# Patient Record
Sex: FEMALE | Race: WHITE | NOT HISPANIC OR LATINO | Employment: PART TIME | URBAN - METROPOLITAN AREA
[De-identification: names, ages, dates, MRNs, and addresses within clinical notes are randomized per-mention and may not be internally consistent; named-entity substitution may affect disease eponyms.]

---

## 2017-01-16 DIAGNOSIS — R11.0 NAUSEA: Primary | ICD-10-CM

## 2017-01-16 DIAGNOSIS — Z29.89 ALTITUDE SICKNESS PREVENTATIVE MEASURES: ICD-10-CM

## 2017-01-16 RX ORDER — ONDANSETRON 4 MG/1
4-8 TABLET, ORALLY DISINTEGRATING ORAL EVERY 8 HOURS PRN
Qty: 20 TABLET | Refills: 1 | Status: SHIPPED | OUTPATIENT
Start: 2017-01-16 | End: 2019-02-22

## 2017-04-05 DIAGNOSIS — J06.9 VIRAL UPPER RESPIRATORY TRACT INFECTION: Primary | ICD-10-CM

## 2017-04-05 RX ORDER — AZITHROMYCIN 250 MG/1
TABLET, FILM COATED ORAL
Qty: 6 TABLET | Refills: 1 | Status: SHIPPED | OUTPATIENT
Start: 2017-04-05 | End: 2017-04-17

## 2017-04-17 ENCOUNTER — OFFICE VISIT (OUTPATIENT)
Dept: DERMATOLOGY | Facility: CLINIC | Age: 32
End: 2017-04-17
Attending: DERMATOLOGY
Payer: COMMERCIAL

## 2017-04-17 VITALS
WEIGHT: 250 LBS | SYSTOLIC BLOOD PRESSURE: 113 MMHG | DIASTOLIC BLOOD PRESSURE: 75 MMHG | HEIGHT: 67 IN | BODY MASS INDEX: 39.24 KG/M2 | HEART RATE: 101 BPM

## 2017-04-17 DIAGNOSIS — D22.9 BENIGN NEVUS: ICD-10-CM

## 2017-04-17 DIAGNOSIS — L85.8 KP (KERATOSIS PILARIS): ICD-10-CM

## 2017-04-17 DIAGNOSIS — D48.5 NEOPLASM OF UNCERTAIN BEHAVIOR OF SKIN: Primary | ICD-10-CM

## 2017-04-17 PROCEDURE — 88305 TISSUE EXAM BY PATHOLOGIST: CPT | Performed by: DERMATOLOGY

## 2017-04-17 PROCEDURE — 11421 EXC H-F-NK-SP B9+MARG 0.6-1: CPT | Mod: ZF | Performed by: DERMATOLOGY

## 2017-04-17 PROCEDURE — 99213 OFFICE O/P EST LOW 20 MIN: CPT | Mod: ZF

## 2017-04-17 PROCEDURE — 12041 INTMD RPR N-HF/GENIT 2.5CM/<: CPT | Mod: ZF | Performed by: DERMATOLOGY

## 2017-04-17 RX ORDER — LIDOCAINE HYDROCHLORIDE AND EPINEPHRINE 10; 10 MG/ML; UG/ML
3 INJECTION, SOLUTION INFILTRATION; PERINEURAL ONCE
Qty: 3 ML | Refills: 0 | OUTPATIENT
Start: 2017-04-17 | End: 2017-04-17

## 2017-04-17 RX ORDER — CETIRIZINE HYDROCHLORIDE 10 MG/1
10 TABLET ORAL
COMMUNITY
Start: 2012-02-06

## 2017-04-17 ASSESSMENT — PAIN SCALES - GENERAL: PAINLEVEL: NO PAIN (0)

## 2017-04-17 NOTE — PROGRESS NOTES
Dermatology Clinic Note    Dermatology Problem List:  1. Keratosis pilaris  2. Benign nevi  3. Neoplasm of uncertain behavior on the L lateral neck, excisional bx on 4/17/17. Suspected traumatized nevus vs dysplastic nevus  4. Hx of tanning bed use weekly x 4-5 years while in high school      CC:   Chief Complaint   Patient presents with     Establish Care     Skin chek , mole on neck         HPI: Flori Richards is a 31 year old female presenting for initial evaluation of a mole on the L neck.  She is new and self-referred.  She notes a mole on the L neck that seems like it might be changing. It is difficult for her to monitor given the location. It does not bleed and is not painful. No other similar spots. No hx of skin cancer or dysplastic nevus. She is now cautious in the sun, but does have hx of tanning bed use and a severe sunburn as a teen.       Patient Active Problem List   Diagnosis     CARDIOVASCULAR SCREENING; LDL GOAL LESS THAN 160       Allergies   Allergen Reactions     Sulfa Drugs Hives         Current Outpatient Prescriptions   Medication     cetirizine (ZYRTEC) 10 MG tablet     Esomeprazole Magnesium (NEXIUM PO)     drospirenone-ethinyl estradiol (SHIVANI) 3-0.02 MG per tablet     ondansetron (ZOFRAN ODT) 4 MG ODT tab     [DISCONTINUED] drospirenone-ethinyl estradiol (SHIVANI) 3-0.02 MG per tablet     No current facility-administered medications for this visit.        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     EXAM:  /75  Pulse 101  Ht 1.702 m (5' 7\")  Wt 113.4 kg (250 lb)  Breastfeeding? No  BMI 39.16 kg/m2  GEN: Alert, no distress  HEENT: Conjunctiva clear.   PULM: Breathing comfortably on RA  CV: Extrem warm and well perfused  ABD: No distension  SKIN: Exam of the face, neck, chest, abdomen, back, arms, legs, hands, feet, buttocks. Normal except as follows:    --6 mm x5 mm dark brown slightly raises papule on the L lateral neck with pigment globules centrally  --Pink hyperkeratotic follicularly based papules on the lateral arms, thighs, buttocks bilaterally.   --Scattered but few 1-3 mm medium brown macules on the extensor arms, legs, back  --Firm 4 mm subcutaneous papule on the R upper arms with + dimple sign.     Procedure Note:  Patient was consented to an excisional biopsy. Area was cleansed with alcohol pad and area was infiltrated with 2ml of lidocaine with epinephrine. An elliptical excision with 15 blade to the level of the subcutis was performed and lesion was closed with a 4.0 vicryl deep suture and 3 prolene interrupted sutures. Wound dressed with petrolatum and bandage. Patient to change dressing daily. Suture removal in 10 days.   Lesion size 6 mm x 5 mm  Margins 1 mm  Closure length 8 mm          Assessment and Plan:    1. Neoplasm of uncertain behavior of skin: Changing nevus on the L lateral neck. Concern for dysplastic nevus vs traumatized compound nevus. Patient with hx of tanning bed use. Excised today. Will contact with results with available.   - Surgical pathology exam    2. Keratosis pilaris: Benign skin variant. May use emollients with lactic or salicylic acid such as Amlactin, Cerave SA.     3. Benign pigmented nevi: No lesions of concern. Sun protection recommended. Discussed ABCDEs of malignant melanoma.     4. Dermatofibroma: Benign collagenous collection on the R " upper arm. No treatment advised unless growing.       RTC in 1 year, sooner as needed.       Katrina Fair MD  Dermatology Staff

## 2017-04-17 NOTE — MR AVS SNAPSHOT
After Visit Summary   4/17/2017    Flori Richards    MRN: [id number]           Patient Information     Date Of Birth          1985        Visit Information        Provider Department      4/17/2017 8:15 AM Katrina Fair MD [facility]        Care Instructions    WOUND CARE: Wound Care After a Biopsy    How should I care for my wound for the first 24 hours?    If it bleeds, hold direct pressure on the area for 10 minutes    Acetaminophen (TylenolTM) as needed for pain    How should I care for the wound after 24 hours?    Remove the bandage     You may bathe or shower as normal    How do I clean my wound?    Use white petroleum/Vaseline  to keep sutures moist twice daily.     Replace the Bandaid /bandage to keep the wound covered until it is completely healed (not needed in the scalp)    What should I use to clean my wound?     White petroleum jelly (Vaseline )     Bandaids   as needed    How should I care for my wound after a shave biopsy?    Keep up with wound care for one week or until the area is healed     How should I care for my wound after a punch biopsy?    Complete wound care until the stitches are removed     Stitches need to be removed in 14 days. You may return to our clinic for this or you may have it done locally at your doctor s office.    When should I call my doctor?    If you have increased:   o Pain or swelling    o Pus or drainage  o Temperature over 101? F (38.3 ? C)   Redness or warmth  around wound          Follow-ups after your visit        Who to contact     Please call your clinic at [phone] to:    Ask questions about your health    Make or cancel appointments    Discuss your medicines    Learn about your test results    Speak to your doctor   If you have compliments or concerns about an experience at your clinic, or if you wish to file a complaint, please contact [facility] Patient Relations at [phone] or  "email us at [email or web address]         Additional Information About Your Visit        [employer] [facility] Information     [facility] gives you secure access to your electronic health record. If you see a primary care provider, you can also send messages to your care team and make appointments. If you have questions, please call your primary care clinic.  If you do not have a primary care provider, please call [phone] and they will assist you.      [facility] is an electronic gateway that provides easy, online access to your medical records. With [employer], you can request a clinic appointment, read your test results, renew a prescription or communicate with your care team.     To access your existing account, please contact your [facility] or call [phone] for assistance.        Care EveryWhere ID     This is your Care EveryWhere ID. This could be used by other organizations to access your [facility] medical records  [id number]        Your Vitals Were     Pulse Height Breastfeeding? BMI (Body Mass Index)          101 1.702 m (5' 7\") No 39.16 kg/m2         Blood Pressure from Last 3 Encounters:   04/17/17 113/75   11/07/16 118/80   07/20/15 116/76    Weight from Last 3 Encounters:   04/17/17 113.4 kg (250 lb)   11/07/16 117.9 kg (260 lb)   07/20/15 116.1 kg (256 lb)              Today, you had the following     No orders found for display         Today's Medication Changes          These changes are accurate as of: 4/17/17  8:58 AM.  If you have any questions, ask your nurse or doctor.               These medicines have changed or have updated prescriptions.        Dose/Directions    drospirenone-ethinyl estradiol 3-0.02 MG per tablet   Commonly known as:  SHIVANI   This may have changed:  Another medication with the same name was removed. Continue taking this medication, and follow the directions you see here.   Used for:  Routine general medical examination at a health care facility, " Other general counseling and advice for contraceptive management   Changed by:  Megan Parr APRN CNP        Dose:  1 tablet   Take 1 tablet by mouth daily   Quantity:  3 Package   Refills:  3         Stop taking these medicines if you haven't already. Please contact your care team if you have questions.     acetaminophen-codeine 300-30 MG per tablet   Commonly known as:  TYLENOL w/CODEINE No. 3   Stopped by:  Katrina Fair MD           azithromycin 250 MG tablet   Commonly known as:  ZITHROMAX   Stopped by:  Katrina Fair MD           cyclobenzaprine 10 MG tablet   Commonly known as:  FLEXERIL   Stopped by:  Katrina Fair MD           lidocaine visc 2% & diphenhydramine 12.5mg/5mL & maalox/mylanta w/simethicone (1:1:1 v/v/v) Susp compounding kit   Stopped by:  Katrina Fair MD                    Primary Care Provider    None Specified       No primary provider on file.        Thank you!     Thank you for choosing [facility]  for your care. Our goal is always to provide you with excellent care. Hearing back from our patients is one way we can continue to improve our services. Please take a few minutes to complete the written survey that you may receive in the mail after your visit with us. Thank you!             Your Updated Medication List - Protect others around you: Learn how to safely use, store and throw away your medicines at www.disposemymeds.org.          This list is accurate as of: 4/17/17  8:58 AM.  Always use your most recent med list.                   Brand Name Dispense Instructions for use    cetirizine 10 MG tablet    zyrTEC     Take 10 mg by mouth       drospirenone-ethinyl estradiol 3-0.02 MG per tablet    SHIVANI    3 Package    Take 1 tablet by mouth daily       NEXIUM PO          ondansetron 4 MG ODT tab    ZOFRAN ODT    20 tablet    Take 1-2 tablets (4-8 mg) by mouth every 8 hours as needed for nausea

## 2017-04-17 NOTE — NURSING NOTE
Chief Complaint   Patient presents with     Establish Care     Skin chek , mole on neck   Una Martinez LPN

## 2017-04-17 NOTE — LETTER
Date:April 18, 2017      Patient was self referred, no letter generated. Do not send.        [facility] Health Information

## 2017-04-17 NOTE — LETTER
4/17/2017       RE: Flori Richards  [address]   [place] [address]     Dear Colleague,    Thank you for referring your patient, Flori Richards, to the [facility] at [facility]. Please see a copy of my visit note below.    Dermatology Clinic Note    Dermatology Problem List:  1. Keratosis pilaris  2. Benign nevi  3. Neoplasm of uncertain behavior on the L lateral neck, excisional bx on 4/17/17. Suspected traumatized nevus vs dysplastic nevus  4. Hx of tanning bed use weekly x 4-5 years while in high school      CC:   Chief Complaint   Patient presents with     [facility]     Skin chek , mole on neck         HPI: Flori Richards is a 31 year old female presenting for initial evaluation of a mole on the L neck.  She is new and self-referred.  She notes a mole on the L neck that seems like it might be changing. It is difficult for her to monitor given the location. It does not bleed and is not painful. No other similar spots. No hx of skin cancer or dysplastic nevus. She is now cautious in the sun, but does have hx of tanning bed use and a severe sunburn as a teen.       Patient Active Problem List   Diagnosis     CARDIOVASCULAR SCREENING; LDL GOAL LESS THAN 160       Allergies   Allergen Reactions     Sulfa Drugs Hives         Current Outpatient Prescriptions   Medication     cetirizine (ZYRTEC) 10 MG tablet     Esomeprazole Magnesium (NEXIUM PO)     drospirenone-ethinyl estradiol (SHIVANI) 3-0.02 MG per tablet     ondansetron (ZOFRAN ODT) 4 MG ODT tab     [DISCONTINUED] drospirenone-ethinyl estradiol (SHIVANI) 3-0.02 MG per tablet     No current facility-administered medications for this visit.        Family History   Problem Relation Age of Onset     Breast Cancer Maternal Aunt        Social History     Social History     Marital status: Single     Spouse name: N/A     Number of children: N/A     Years of education: N/A     Occupational History      "RN [employer]     [employer]     Social History Main Topics     Smoking status: Never Smoker     Smokeless tobacco: Never Used     Alcohol use Yes      Comment: rarely     Drug use: No     Sexual activity: Not Currently     Birth control/ protection: Pill     Other Topics Concern     Not on file     Social History Narrative         ROS: Feeling well without other skin concerns.     EXAM:  /75  Pulse 101  Ht 1.702 m (5' 7\")  Wt 113.4 kg (250 lb)  Breastfeeding? No  BMI 39.16 kg/m2  GEN: Alert, no distress  HEENT: Conjunctiva clear.   PULM: Breathing comfortably on RA  CV: Extrem warm and well perfused  ABD: No distension  SKIN: Exam of the face, neck, chest, abdomen, back, arms, legs, hands, feet, buttocks. Normal except as follows:    --6 mm x5 mm dark brown slightly raises papule on the L lateral neck with pigment globules centrally  --Pink hyperkeratotic follicularly based papules on the lateral arms, thighs, buttocks bilaterally.   --Scattered but few 1-3 mm medium brown macules on the extensor arms, legs, back  --Firm 4 mm subcutaneous papule on the R upper arms with + dimple sign.     Procedure Note:  Patient was consented to an excisional biopsy. Area was cleansed with alcohol pad and area was infiltrated with 2ml of lidocaine with epinephrine. An elliptical excision with 15 blade to the level of the subcutis was performed and lesion was closed with a 4.0 vicryl deep suture and 3 prolene interrupted sutures. Wound dressed with petrolatum and bandage. Patient to change dressing daily. Suture removal in 10 days.   Lesion size 6 mm x 5 mm  Margins 1 mm  Closure length 8 mm          Assessment and Plan:    1. Neoplasm of uncertain behavior of skin: Changing nevus on the L lateral neck. Concern for dysplastic nevus vs traumatized compound nevus. Patient with hx of tanning bed use. Excised today. Will contact with results with available.   - Surgical pathology exam    2. " Keratosis pilaris: Benign skin variant. May use emollients with lactic or salicylic acid such as Amlactin, Cerave SA.     3. Benign pigmented nevi: No lesions of concern. Sun protection recommended. Discussed ABCDEs of malignant melanoma.     4. Dermatofibroma: Benign collagenous collection on the R upper arm. No treatment advised unless growing.       RTC in 1 year, sooner as needed.       Katrina Fair MD  Dermatology Staff                  Again, thank you for allowing me to participate in the care of your patient.      Sincerely,    Katrina Fair MD

## 2017-04-17 NOTE — PATIENT INSTRUCTIONS
WOUND CARE: Wound Care After a Biopsy    How should I care for my wound for the first 24 hours?    If it bleeds, hold direct pressure on the area for 10 minutes    Acetaminophen (TylenolTM) as needed for pain    How should I care for the wound after 24 hours?    Remove the bandage     You may bathe or shower as normal    How do I clean my wound?    Use white petroleum/Vaseline  to keep sutures moist twice daily.     Replace the Bandaid /bandage to keep the wound covered until it is completely healed (not needed in the scalp)    What should I use to clean my wound?     White petroleum jelly (Vaseline )     Bandaids   as needed    How should I care for my wound after a shave biopsy?    Keep up with wound care for one week or until the area is healed     How should I care for my wound after a punch biopsy?    Complete wound care until the stitches are removed     Stitches need to be removed in 14 days. You may return to our clinic for this or you may have it done locally at your doctor s office.    When should I call my doctor?    If you have increased:   o Pain or swelling    o Pus or drainage  o Temperature over 101? F (38.3 ? C)   Redness or warmth  around wound

## 2017-04-19 LAB — COPATH REPORT: NORMAL

## 2017-06-21 ENCOUNTER — TELEPHONE (OUTPATIENT)
Dept: SURGERY | Facility: CLINIC | Age: 32
End: 2017-06-21

## 2017-06-21 NOTE — TELEPHONE ENCOUNTER
"Patient interested in gastric balloon if covered by insurance.  Ht 5'7\", wt 255 lbs, BMI 39.9. Does not have any obesity related co-morbidities.    Plan:  To set up appointment with medical weight management.  To start a food, feelings and activity journal in the meantime.  "

## 2017-10-30 ENCOUNTER — TRANSFERRED RECORDS (OUTPATIENT)
Dept: HEALTH INFORMATION MANAGEMENT | Facility: CLINIC | Age: 32
End: 2017-10-30

## 2017-12-17 ENCOUNTER — HEALTH MAINTENANCE LETTER (OUTPATIENT)
Age: 32
End: 2017-12-17

## 2018-02-08 DIAGNOSIS — J01.10 ACUTE NON-RECURRENT FRONTAL SINUSITIS: Primary | ICD-10-CM

## 2018-02-08 RX ORDER — AZITHROMYCIN 250 MG/1
TABLET, FILM COATED ORAL
Qty: 6 TABLET | Refills: 0 | Status: SHIPPED | OUTPATIENT
Start: 2018-02-08 | End: 2019-04-01

## 2018-03-07 ENCOUNTER — TELEPHONE (OUTPATIENT)
Dept: OTHER | Facility: CLINIC | Age: 33
End: 2018-03-07

## 2018-03-07 NOTE — TELEPHONE ENCOUNTER
3/7/2018    Call Regarding [employer]    Attempt 1    Message on voicemail     Comments: no dep      Outreach   Neeru Johnson

## 2019-02-22 DIAGNOSIS — Z29.89 ALTITUDE SICKNESS PREVENTATIVE MEASURES: ICD-10-CM

## 2019-02-22 RX ORDER — ONDANSETRON 4 MG/1
4-8 TABLET, ORALLY DISINTEGRATING ORAL EVERY 8 HOURS PRN
Qty: 20 TABLET | Refills: 1 | Status: SHIPPED | OUTPATIENT
Start: 2019-02-22 | End: 2019-04-01

## 2019-04-01 ENCOUNTER — OFFICE VISIT (OUTPATIENT)
Dept: INTERNAL MEDICINE | Facility: CLINIC | Age: 34
End: 2019-04-01
Payer: COMMERCIAL

## 2019-04-01 VITALS
HEART RATE: 98 BPM | HEIGHT: 66 IN | BODY MASS INDEX: 42.23 KG/M2 | DIASTOLIC BLOOD PRESSURE: 85 MMHG | OXYGEN SATURATION: 98 % | SYSTOLIC BLOOD PRESSURE: 123 MMHG | WEIGHT: 262.8 LBS

## 2019-04-01 DIAGNOSIS — R42 DIZZY SPELLS: Primary | ICD-10-CM

## 2019-04-01 DIAGNOSIS — R42 DIZZY SPELLS: ICD-10-CM

## 2019-04-01 LAB
ANION GAP SERPL CALCULATED.3IONS-SCNC: 8 MMOL/L (ref 3–14)
BUN SERPL-MCNC: 7 MG/DL (ref 7–30)
CALCIUM SERPL-MCNC: 8.7 MG/DL (ref 8.5–10.1)
CHLORIDE SERPL-SCNC: 107 MMOL/L (ref 94–109)
CO2 SERPL-SCNC: 21 MMOL/L (ref 20–32)
CREAT SERPL-MCNC: 0.59 MG/DL (ref 0.52–1.04)
GFR SERPL CREATININE-BSD FRML MDRD: >90 ML/MIN/{1.73_M2}
GLUCOSE SERPL-MCNC: 80 MG/DL (ref 70–99)
HBA1C MFR BLD: 5.4 % (ref 0–5.6)
HGB BLD-MCNC: 11.4 G/DL (ref 11.7–15.7)
POTASSIUM SERPL-SCNC: 3.5 MMOL/L (ref 3.4–5.3)
SODIUM SERPL-SCNC: 137 MMOL/L (ref 133–144)

## 2019-04-01 RX ORDER — CHOLECALCIFEROL (VITAMIN D3) 50 MCG
2 TABLET ORAL DAILY
COMMUNITY
End: 2023-04-17

## 2019-04-01 ASSESSMENT — ENCOUNTER SYMPTOMS
LOSS OF CONSCIOUSNESS: 0
WEAKNESS: 0
HEADACHES: 0
TINGLING: 0
SEIZURES: 0
MEMORY LOSS: 0
PARALYSIS: 0
TREMORS: 0
DISTURBANCES IN COORDINATION: 0
SPEECH CHANGE: 0
NUMBNESS: 0
DIZZINESS: 1

## 2019-04-01 ASSESSMENT — MIFFLIN-ST. JEOR: SCORE: 1924.18

## 2019-04-01 ASSESSMENT — PAIN SCALES - GENERAL: PAINLEVEL: NO PAIN (0)

## 2019-04-01 NOTE — PROGRESS NOTES
HPI:       Flori Richards is a 33 year old female who presents for the following  Patient presents with: Establish Care (Pt is here to establish care with a new PCP) and Dizziness (Pt c/o of dizziness for over a month now.)    Flori is here for evaluation of extreme dizziness over the past several weeks. It happens in short bouts-about 3 seconds. This happens about 6 times/day. She feels as though she is almost going to blackout and her head feels foggy. She does not feel as though the room spins. She does get symptoms when she turns her head, but not when she turns over in bed. She also gets symptoms with laughing or coughing. She did also have an episode where she fell down her steps. She does not recall actually falling, but just ending up at the bottom of her steps.  She has tried increasing her water intake to 80 oz/day, which has not made much of a difference. Also, it does not improve when increasing nutrition or sleep. She has also tried to decrease screen time which has not made a difference. She has not had symptoms with physical activity.     Problem, Medication and Allergy Lists were reviewed and are current.  Patient is a new patient to this clinic and so  I reviewed/updated the Past Medical History, the Family History and the Social History.          Review of Systems:   Review of Systems     Constitutional:  Negative for fever, chills, weight loss, weight gain, fatigue, decreased appetite, night sweats, recent stressors, height gain, height loss, post-operative complications, incisional pain, hallucinations, increased energy, hyperactivity and confused.   HENT:  Negative for ear pain, hearing loss, tinnitus, nosebleeds, trouble swallowing, hoarse voice, mouth sores, sore throat, ear discharge, tooth pain, gum tenderness, taste disturbance, smell disturbance, hearing aid, bleeding gums, dry mouth, sinus pain, sinus congestion and neck mass.    Eyes:  Negative for double vision, pain, redness,  eye pain, decreased vision, eye watering, eye bulging, eye dryness, flashing lights, spots, floaters, strabismus, tunnel vision, jaundice and eye irritation.   Respiratory:   Negative for cough, hemoptysis, sputum production, shortness of breath, wheezing, sleep disturbances due to breathing, snores loudly, respiratory pain, dyspnea on exertion, cough disturbing sleep and postural dyspnea.    Cardiovascular:  Negative for chest pain, dyspnea on exertion, palpitations, orthopnea, claudication, leg swelling, fingers/toes turn blue, hypertension, hypotension, syncope, history of heart murmur, chest pain on exertion, chest pain at rest, pacemaker, few scattered varicosities, leg pain, sleep disturbances due to breathing, tachycardia, light-headedness, exercise intolerance and edema.   Gastrointestinal:  Negative for heartburn, nausea, vomiting, abdominal pain, diarrhea, constipation, blood in stool, melena, rectal pain, bloating, hemorrhoids, bowel incontinence, jaundice, rectal bleeding, coffee ground emesis and change in stool.   Genitourinary:  Negative for bladder incontinence, dysuria, urgency, hematuria, flank pain, vaginal discharge, difficulty urinating, genital sores, dyspareunia, decreased libido, nocturia, voiding less frequently, arousal difficulty, abnormal vaginal bleeding, excessive menstruation, menstrual changes, hot flashes, vaginal dryness and postmenopausal bleeding.   Musculoskeletal:  Negative for myalgias, back pain, joint swelling, arthralgias, stiffness, muscle cramps, neck pain, bone pain, muscle weakness and fracture.   Skin:  Negative for nail changes, itching, poor wound healing, rash, hair changes, skin changes, acne, warts, poor wound healing, scarring, flaky skin, Raynaud's phenomenon, sensitivity to sunlight and skin thickening.   Neurological:  Positive for dizziness. Negative for tingling, tremors, speech change, seizures, loss of consciousness, weakness, light-headedness, numbness,  "headaches, disturbances in coordination, memory loss, difficulty walking and paralysis.   Endo/Heme:  Negative for anemia, swollen glands and bruises/bleeds easily.   Psychiatric/Behavioral:  Negative for depression, hallucinations, memory loss, decreased concentration, mood swings and panic attacks.    Breast:  Negative for breast discharge, breast mass, breast pain and nipple retraction.   Endocrine:  Negative for altered temperature regulation, polyphagia, polydipsia, unwanted hair growth and change in facial hair.    I have personally reviewed and updated the ROS on the day of the visit.           Physical Exam:   /85   Pulse 98   Ht 1.685 m (5' 6.34\")   Wt 119.2 kg (262 lb 12.8 oz)   LMP 02/13/2019 (Approximate)   SpO2 98%   Breastfeeding? No   BMI 41.98 kg/m    Body mass index is 41.98 kg/m .  Vitals were reviewed       GENERAL APPEARANCE: healthy, alert and no distress     EYES: EOMI, PERRL     HENT: ear canals and TM's normal and nose and mouth without ulcers or lesions     NECK: no adenopathy, no asymmetry, masses, or scars and thyroid normal to palpation     RESP: lungs clear to auscultation - no rales, rhonchi or wheezes     CV: regular rates and rhythm, normal S1 S2, no S3 or S4 and no murmur, click or rub     ABDOMEN:  soft, nontender, no HSM or masses and bowel sounds normal     MS: extremities normal- no gross deformities noted, no evidence of inflammation in joints, FROM in all extremities.     SKIN: no suspicious lesions or rashes     NEURO: Normal strength and tone, sensory exam grossly normal, mentation intact and speech normal     NEURO: cranial nerves 2-12 intact, proprioception normal and finger-nose normal     PSYCH: mentation appears normal. and affect normal/bright     LYMPHATICS: No cervical adenopathy        Results:   EKG: Normal sinus rhythm, no ST changes    Assessment and Plan     Flori was seen today for establish care and dizziness.    Diagnoses and all orders for this " visit:    Dizzy spells. Unclear etiology of spells. Differential BPPV vs cardiac vs neurogenic. Since symptoms happen with changes in head position, will have her see PT to r/o BPPV. However, her symptoms are not classic for this (not room spinning). If this is not revealing, will proceed with getting ZioPatch to look for any arrhythmias. It is somewhat concerning that she had a fall that she does not recall actually falling. Less likely would be some type of seizure disorder, however, the short duration and no post-ictal period speaks against this.  -     EKG 12-lead complete w/read - Clinics  -     PHYSICAL THERAPY REFERRAL; Future  -     Hemoglobin; Future  -     Hemoglobin A1c; Future  -     Basic metabolic panel; Future    Options for treatment and follow-up care were reviewed with the patient. Flori Richards engaged in the decision making process and verbalized understanding of the options discussed and agreed with the final plan.    Deandra Dela Cruz MD  Apr 1, 2019

## 2019-04-01 NOTE — NURSING NOTE
Chief Complaint   Patient presents with     Establish Care     Pt is here to establish care with a new PCP     Dizziness     Pt c/o of dizziness for over a month now.       Aleta Zafar, Clinic EMT at 4:12 PM on 4/1/2019

## 2019-04-01 NOTE — PATIENT INSTRUCTIONS
[facility] Phone Number: [phone]   [facility] Center Medication Refill Request Information:  * Please contact your pharmacy regarding ANY request for medication refills.  ** [facility] Prescription Fax = [phone]  * Please allow 3 business days for routine medication refills.  * Please allow 5 business days for controlled substance medication refills.     Primary [facility] Test Result notification information:  *You will be notified with in 7-10 days of your appointment day regarding the results of your test.  If you are on MyChart you will be notified as soon as the provider has reviewed the results and signed off on them.

## 2019-04-02 LAB — INTERPRETATION ECG - MUSE: NORMAL

## 2019-04-02 ASSESSMENT — ENCOUNTER SYMPTOMS
SPEECH CHANGE: 0
MUSCLE WEAKNESS: 0
NECK PAIN: 0
ABDOMINAL PAIN: 0
DEPRESSION: 0
TACHYCARDIA: 0
EYE IRRITATION: 0
ORTHOPNEA: 0
INCREASED ENERGY: 0
BACK PAIN: 0
TROUBLE SWALLOWING: 0
SMELL DISTURBANCE: 0
HEMOPTYSIS: 0
LEG SWELLING: 0
FEVER: 0
DIZZINESS: 1
SINUS CONGESTION: 0
SWOLLEN GLANDS: 0
NECK MASS: 0
POOR WOUND HEALING: 0
SNORES LOUDLY: 0
HOT FLASHES: 0
SPUTUM PRODUCTION: 0
RECTAL PAIN: 0
HALLUCINATIONS: 0
HOARSE VOICE: 0
ARTHRALGIAS: 0
STIFFNESS: 0
JOINT SWELLING: 0
CONSTIPATION: 0
SORE THROAT: 0
BOWEL INCONTINENCE: 0
CLAUDICATION: 0
TINGLING: 0
POLYPHAGIA: 0
INSOMNIA: 0
HEARTBURN: 0
EYE REDNESS: 0
MYALGIAS: 0
LEG PAIN: 0
DECREASED LIBIDO: 0
BREAST MASS: 0
DISTURBANCES IN COORDINATION: 0
HYPOTENSION: 0
SINUS PAIN: 0
COUGH: 0
DECREASED APPETITE: 0
SLEEP DISTURBANCES DUE TO BREATHING: 0
RECTAL BLEEDING: 0
WEIGHT LOSS: 0
NERVOUS/ANXIOUS: 0
MUSCLE CRAMPS: 0
DIFFICULTY URINATING: 0
POLYDIPSIA: 0
WHEEZING: 0
BLOATING: 0
NIGHT SWEATS: 0
LOSS OF CONSCIOUSNESS: 0
JAUNDICE: 0
SKIN CHANGES: 0
RESPIRATORY PAIN: 0
NAUSEA: 0
DYSPNEA ON EXERTION: 0
DECREASED CONCENTRATION: 0
CHILLS: 0
TREMORS: 0
PARALYSIS: 0
SYNCOPE: 0
MEMORY LOSS: 0
DIARRHEA: 0
POSTURAL DYSPNEA: 0
FLANK PAIN: 0
BREAST PAIN: 0
DOUBLE VISION: 0
ALTERED TEMPERATURE REGULATION: 0
VOMITING: 0
TASTE DISTURBANCE: 0
HEADACHES: 0
FATIGUE: 0
PANIC: 0
PALPITATIONS: 0
HEMATURIA: 0
EYE WATERING: 0
BRUISES/BLEEDS EASILY: 0
HYPERTENSION: 0
SHORTNESS OF BREATH: 0
WEIGHT GAIN: 0
WEAKNESS: 0
NUMBNESS: 0
EYE PAIN: 0
DYSURIA: 0
BLOOD IN STOOL: 0
NAIL CHANGES: 0
LIGHT-HEADEDNESS: 0
EXERCISE INTOLERANCE: 0
COUGH DISTURBING SLEEP: 0
SEIZURES: 0

## 2019-04-23 ENCOUNTER — HOSPITAL ENCOUNTER (OUTPATIENT)
Dept: PHYSICAL THERAPY | Facility: CLINIC | Age: 34
Setting detail: THERAPIES SERIES
End: 2019-04-23
Attending: INTERNAL MEDICINE
Payer: COMMERCIAL

## 2019-04-23 DIAGNOSIS — R42 DIZZY SPELLS: ICD-10-CM

## 2019-04-23 PROCEDURE — 95992 CANALITH REPOSITIONING PROC: CPT | Mod: GP | Performed by: PHYSICAL THERAPIST

## 2019-04-23 PROCEDURE — 97161 PT EVAL LOW COMPLEX 20 MIN: CPT | Mod: GP | Performed by: PHYSICAL THERAPIST

## 2019-04-23 NOTE — PROGRESS NOTES
04/23/19 1500   Quick Adds   Quick Adds Vestibular Eval   General Information   Start of Care Date 04/23/19   Referring Physician Deandra Dela Cruz   Orders Evaluate and Treat as Indicated   Order Date 04/01/19   Medical Diagnosis dizziness   Onset of illness/injury or Date of Surgery   (jan 2019)   Surgical/Medical history reviewed Yes   Pertinent history of current problem (include personal factors and/or comorbidities that impact the POC) Flori is here alone.  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  2 months onset  Jan.  quick mov't or bending dizzy spells brief.  no hits to head.  fell down stairs jan.     Pertinent Visual History  contacts or glasses.     Prior level of functional mobility Ambulation   Ambulation no issues w/ gait.  PT noted foot slap bilat    Prior level of function comment works as a nurse.   Current Community Support Family/friend caregiver   Patient role/Employment history Employed  (RN)   Living environment House[facility]   Home/Community Accessibility Comments stairs but fell down in Jan. 20 w/ rail.  got up fine.  carpeted/ was wearing socks.  did EKG was fine.     Patient/Family Goals Statement not be dizzy   General Information Comments pt here alone. lives [facility]   Fall Risk Screen   Fall screen completed by PT   Have you fallen 2 or more times in the past year? No   Have you fallen and had an injury in the past year? No   Fall screen comments one fall in jan, down a flight of steps   System Outcome Measures   Outcome Measures BPPV   Dizziness Handicap Inventory (score out of 100) A decrease in score by 17.18 or greater indicates a clinically significant change in symptoms. 26   Pain   Patient currently in pain No   Pain comments neck is stiff   Vitals Signs   Heart Rate 87   Blood Pressure 121/75   Cognitive Status Examination   Orientation orientation to person, place and time   Level of Consciousness alert   Follows Commands and Answers  Questions 100% of the time   Personal Safety and Judgment intact   Memory intact   Integumentary   Integumentary No deficits were identified   Posture   Posture Forward head position   Range of Motion (ROM)   ROM Comment wfls   Strength   Strength Comments wfls   Bed Mobility   Bed Mobility Comments dizzy in R hallpike   Transfer Skills   Transfer Comments indep   Locomotion   Wheel Chair Mobility Comments n/a   Gait Special Tests   Gait Special Tests 25 FOOT TIMED WALK   Gait Special Tests 25 Foot Timed Walk   Seconds 8    Steps 14 Steps   Comments bilat foot slap noted   Balance   Balance Comments not tested   Sensory Examination   Sensory Perception no deficits were identified   Coordination   Coordination no deficits were identified   Muscle Tone   Muscle Tone no deficits were identified   Cervicogenic Screen   Neck ROM [facility]   Oculomotor Exam   Smooth Pursuit Normal   Saccades Normal   VOR Normal   Rapid Head Thrust Normal   Convergence Testing Abnormal   Convergence Testing Comments R eye sl slow   Infrared Goggle Exam or Frenzel Lense Exam   Vestibular Suppressant in Last 24 Hours? No   Exam completed with Infrared Goggles   Spontaneous Nystagmus Negative   Gaze Evoked Nystagmus Negative   Qi-Hallpike (right) Upbeating R torsional   Qi-Hallpike (right) comments 1 to 2 beats.  mild sxs   Qi-Hallpike (Left) Negative   HSCC Supine Roll Test (Right) Upbeating R torsional   HSCC Supine Roll Test (Right) Comments 10 sec mild sxs   HSCC Supine Roll Test (Left) Negative   BPPV Canal(s) R Posterior   BPPV Type Canalithasis   Planned Therapy Interventions   Planned Therapy Interventions other (see comments);neuromuscular re-education   Planned Therapy Interventions Comment do head shake, treat bppv   Clinical Impression   Criteria for Skilled Therapeutic Interventions Met yes, treatment indicated   PT Diagnosis ?R PC bppv   Influenced by the following impairments dizzy sxs.  nystagmus   Functional limitations due to  impairments dizzy w/ bed mob   Clinical Presentation Stable/Uncomplicated   Clinical Decision Making (Complexity) Low complexity   Therapy Frequency other (see comments)   Predicted Duration of Therapy Intervention (days/wks)  up to 4x in 60 days   Risk & Benefits of therapy have been explained Yes   Patient, Family & other staff in agreement with plan of care Yes   Clinical Impression Comments probable R bppv, noted decreased R eye movt w/ convergence test and bilat foot slap w/ gait w/ slip on shoes.   Education Assessment   Preferred Learning Style Listening   Barriers to Learning No barriers   GOALS   PT Eval Goals 1;2   Goal 1   Goal Identifier dhi   Goal Description dhi score to be at 13 or less to show improved sxs   Target Date 06/12/19   Goal 2   Goal Identifier work   Goal Description pt to report no sxs w/ bending up/down at work   Target Date 06/21/19   Total Evaluation Time   PT Srini Low Complexity Minutes (89663) 20

## 2019-08-08 ENCOUNTER — TELEPHONE (OUTPATIENT)
Dept: OBGYN | Facility: CLINIC | Age: 34
End: 2019-08-08

## 2019-08-08 DIAGNOSIS — T75.3XXA MOTION SICKNESS: Primary | ICD-10-CM

## 2019-08-08 RX ORDER — SCOLOPAMINE TRANSDERMAL SYSTEM 1 MG/1
1 PATCH, EXTENDED RELEASE TRANSDERMAL
Qty: 10 PATCH | Refills: 0 | Status: SHIPPED | OUTPATIENT
Start: 2019-08-08 | End: 2023-04-17

## 2019-09-13 DIAGNOSIS — H00.036 EYELID CELLULITIS, LEFT: Primary | ICD-10-CM

## 2019-12-06 ENCOUNTER — TELEPHONE (OUTPATIENT)
Dept: OBGYN | Facility: CLINIC | Age: 34
End: 2019-12-06

## 2019-12-06 DIAGNOSIS — R11.0 NAUSEA: Primary | ICD-10-CM

## 2019-12-06 RX ORDER — ONDANSETRON 4 MG/1
4 TABLET, ORALLY DISINTEGRATING ORAL EVERY 8 HOURS PRN
Qty: 18 TABLET | Refills: 0 | Status: SHIPPED | OUTPATIENT
Start: 2019-12-06 | End: 2020-04-14

## 2020-03-02 ENCOUNTER — HEALTH MAINTENANCE LETTER (OUTPATIENT)
Age: 35
End: 2020-03-02

## 2020-04-14 DIAGNOSIS — R11.0 NAUSEA: ICD-10-CM

## 2020-04-14 RX ORDER — ONDANSETRON 4 MG/1
4 TABLET, ORALLY DISINTEGRATING ORAL EVERY 8 HOURS PRN
Qty: 18 TABLET | Refills: 0 | Status: SHIPPED | OUTPATIENT
Start: 2020-04-14 | End: 2022-01-12

## 2020-12-20 ENCOUNTER — HEALTH MAINTENANCE LETTER (OUTPATIENT)
Age: 35
End: 2020-12-20

## 2021-04-24 ENCOUNTER — HEALTH MAINTENANCE LETTER (OUTPATIENT)
Age: 36
End: 2021-04-24

## 2022-01-12 DIAGNOSIS — R11.0 NAUSEA: ICD-10-CM

## 2022-01-12 RX ORDER — ONDANSETRON 4 MG/1
4 TABLET, ORALLY DISINTEGRATING ORAL EVERY 8 HOURS PRN
Qty: 18 TABLET | Refills: 0 | Status: SHIPPED | OUTPATIENT
Start: 2022-01-12 | End: 2022-10-28

## 2022-05-15 ENCOUNTER — HEALTH MAINTENANCE LETTER (OUTPATIENT)
Age: 37
End: 2022-05-15

## 2022-08-19 DIAGNOSIS — N30.00 ACUTE CYSTITIS WITHOUT HEMATURIA: Primary | ICD-10-CM

## 2022-08-19 RX ORDER — NITROFURANTOIN 25; 75 MG/1; MG/1
100 CAPSULE ORAL 2 TIMES DAILY
Qty: 20 CAPSULE | Refills: 0 | Status: SHIPPED | OUTPATIENT
Start: 2022-08-19 | End: 2023-04-17

## 2022-08-19 RX ORDER — PHENAZOPYRIDINE HYDROCHLORIDE 100 MG/1
100 TABLET, FILM COATED ORAL 3 TIMES DAILY PRN
Qty: 9 TABLET | Refills: 0 | Status: SHIPPED | OUTPATIENT
Start: 2022-08-19 | End: 2023-04-17

## 2022-09-10 ENCOUNTER — HEALTH MAINTENANCE LETTER (OUTPATIENT)
Age: 37
End: 2022-09-10

## 2022-10-28 DIAGNOSIS — R11.0 NAUSEA: ICD-10-CM

## 2022-10-28 RX ORDER — ONDANSETRON 4 MG/1
4 TABLET, ORALLY DISINTEGRATING ORAL EVERY 8 HOURS PRN
Qty: 18 TABLET | Refills: 0 | Status: SHIPPED | OUTPATIENT
Start: 2022-10-28 | End: 2023-01-20

## 2023-01-20 DIAGNOSIS — R11.0 NAUSEA: ICD-10-CM

## 2023-01-20 RX ORDER — ONDANSETRON 4 MG/1
4 TABLET, ORALLY DISINTEGRATING ORAL EVERY 8 HOURS PRN
Qty: 18 TABLET | Refills: 3 | Status: SHIPPED | OUTPATIENT
Start: 2023-01-20

## 2023-03-14 ENCOUNTER — TELEPHONE (OUTPATIENT)
Dept: OBGYN | Facility: CLINIC | Age: 38
End: 2023-03-14

## 2023-03-14 DIAGNOSIS — N93.9 ABNORMAL VAGINAL BLEEDING: Primary | ICD-10-CM

## 2023-04-17 ENCOUNTER — OFFICE VISIT (OUTPATIENT)
Dept: DERMATOLOGY | Facility: CLINIC | Age: 38
End: 2023-04-17
Attending: DERMATOLOGY
Payer: COMMERCIAL

## 2023-04-17 VITALS
WEIGHT: 229 LBS | BODY MASS INDEX: 36.58 KG/M2 | HEART RATE: 98 BPM | SYSTOLIC BLOOD PRESSURE: 108 MMHG | DIASTOLIC BLOOD PRESSURE: 74 MMHG

## 2023-04-17 DIAGNOSIS — L98.8 RHYTIDES: Primary | ICD-10-CM

## 2023-04-17 DIAGNOSIS — D23.9 DERMATOFIBROMA: ICD-10-CM

## 2023-04-17 DIAGNOSIS — D22.9 MULTIPLE NEVI: ICD-10-CM

## 2023-04-17 PROCEDURE — 99203 OFFICE O/P NEW LOW 30 MIN: CPT | Performed by: DERMATOLOGY

## 2023-04-17 PROCEDURE — G0463 HOSPITAL OUTPT CLINIC VISIT: HCPCS | Performed by: DERMATOLOGY

## 2023-04-17 ASSESSMENT — PAIN SCALES - GENERAL: PAINLEVEL: NO PAIN (0)

## 2023-04-17 NOTE — PROGRESS NOTES
Dermatology Clinic Note    Dermatology Problem List:  1. Keratosis pilaris  2. Benign nevi  3. Dermatofibromas  4. Hx of tanning bed use weekly x 4-5 years while in high school      CC:   Chief Complaint   Patient presents with     RECHECK         HPI: Flori Richards is a 36 y/o presenting for skin check. No history of skin cancer. No lesions of concern. She is interested in cosmetic botox. She notes a new lump on the ankle.       Patient Active Problem List   Diagnosis     CARDIOVASCULAR SCREENING; LDL GOAL LESS THAN 160     Neoplasm of uncertain behavior of skin     Benign nevus     KP (keratosis pilaris)       Allergies   Allergen Reactions     Sulfa Drugs Hives     Sulfamethoxazole-Trimethoprim Rash     Rash but possible hives when young         Current Outpatient Medications   Medication     drospirenone-ethinyl estradiol (SHIVANI) 3-0.02 MG per tablet     Esomeprazole Magnesium (NEXIUM PO)     cetirizine (ZYRTEC) 10 MG tablet     ondansetron (ZOFRAN ODT) 4 MG ODT tab     No current facility-administered medications for this visit.       Family History   Problem Relation Age of Onset     Breast Cancer Maternal Aunt      Depression Mother      Hypertension Mother      Obesity Mother      Hyperlipidemia Mother      Uterine Cancer Mother      Hypertension Father      Obesity Father      Lung Cancer Paternal Grandmother        ROS: Feeling well without other skin concerns.     EXAM:  /74   Pulse 98   Wt 103.9 kg (229 lb)   BMI 36.58 kg/m    GEN: Alert, no distress  HEENT: Conjunctiva clear.   PULM: Breathing comfortably on RA  CV: Extrem warm and well perfused  ABD: No distension  SKIN: Exam of the face, neck, chest, abdomen, back, arms, legs, hands, feet, buttocks. Normal except as follows:  --Pink hyperkeratotic follicularly based papules on the lateral arms, thighs, buttocks bilaterally.   --Scattered but few 1-3 mm medium brown macules on the extensor arms, legs, back  --Firm 4 mm subcutaneous papule on  the R upper arm with + dimple sign.   --Firm 4 mm papule on the dorsal shin with +dimple sign      Assessment and Plan:    1. Benign pigmented nevi: No lesions of concern. Sun protection recommended. Discussed ABCDEs of malignant melanoma.     2. Dermatofibromas: Collection of excess collagen and fibrosis at past sites of skin injury. Benign. No treatment advised as this may result in a larger lesion. Lesion on the arm and leg. No treatment.     3. Rhytides: Referral for cosmetic botox.       RTC in as needed, will fup with cosmetics for botox.     Katrina Fair MD  Dermatology Staff

## 2023-04-17 NOTE — PATIENT INSTRUCTIONS
Thank you for trusting us with your care!     If you need to contact us for questions about:  Symptoms, Scheduling & Medical Questions; Non-urgent (2-3 day response) Eloisa message, Urgent (needing response today) [phone] (if after 3:30pm next day response)   Prescriptions: Please call your Pharmacy   Billing: Delmi [phone] or [facility]:[phone]

## 2023-04-17 NOTE — LETTER
4/17/2023      RE: Flori Richards  [address] [place] [address]     Dear Colleague,    Thank you for the opportunity to participate in the care of your patient, Flori Richards, at the [facility]. Please see a copy of my visit note below.    Dermatology Clinic Note    Dermatology Problem List:  1. Keratosis pilaris  2. Benign nevi  3. Dermatofibromas  4. Hx of tanning bed use weekly x 4-5 years while in high school      CC:   Chief Complaint   Patient presents with    RECHECK         HPI: Flori Richards is a 36 y/o presenting for skin check. No history of skin cancer. No lesions of concern. She is interested in cosmetic botox. She notes a new lump on the ankle.       Patient Active Problem List   Diagnosis    CARDIOVASCULAR SCREENING; LDL GOAL LESS THAN 160    Neoplasm of uncertain behavior of skin    Benign nevus    KP (keratosis pilaris)       Allergies   Allergen Reactions    Sulfa Drugs Hives    Sulfamethoxazole-Trimethoprim Rash     Rash but possible hives when young         Current Outpatient Medications   Medication    drospirenone-ethinyl estradiol (SHIVANI) 3-0.02 MG per tablet    Esomeprazole Magnesium (NEXIUM PO)    cetirizine (ZYRTEC) 10 MG tablet    ondansetron (ZOFRAN ODT) 4 MG ODT tab     No current facility-administered medications for this visit.       Family History   Problem Relation Age of Onset    Breast Cancer Maternal Aunt     Depression Mother     Hypertension Mother     Obesity Mother     Hyperlipidemia Mother     Uterine Cancer Mother     Hypertension Father     Obesity Father     Lung Cancer Paternal Grandmother        ROS: Feeling well without other skin concerns.     EXAM:  /74   Pulse 98   Wt 103.9 kg (229 lb)   BMI 36.58 kg/m    GEN: Alert, no distress  HEENT: Conjunctiva clear.   PULM: Breathing comfortably on RA  CV: Extrem warm and well perfused  ABD: No distension  SKIN:  Exam of the face, neck, chest, abdomen, back, arms, legs, hands, feet, buttocks. Normal except as follows:  --Pink hyperkeratotic follicularly based papules on the lateral arms, thighs, buttocks bilaterally.   --Scattered but few 1-3 mm medium brown macules on the extensor arms, legs, back  --Firm 4 mm subcutaneous papule on the R upper arm with + dimple sign.   --Firm 4 mm papule on the dorsal shin with +dimple sign      Assessment and Plan:    1. Benign pigmented nevi: No lesions of concern. Sun protection recommended. Discussed ABCDEs of malignant melanoma.     2. Dermatofibromas: Collection of excess collagen and fibrosis at past sites of skin injury. Benign. No treatment advised as this may result in a larger lesion. Lesion on the arm and leg. No treatment.     3. Rhytides: Referral for cosmetic botox.       RTC in as needed, will fup with cosmetics for botox.     Katrina Fair MD  Dermatology Staff

## 2023-05-12 DIAGNOSIS — E66.01 MORBID OBESITY (H): Primary | ICD-10-CM

## 2023-05-15 ENCOUNTER — TELEPHONE (OUTPATIENT)
Dept: OBGYN | Facility: CLINIC | Age: 38
End: 2023-05-15
Payer: COMMERCIAL

## 2023-05-15 NOTE — TELEPHONE ENCOUNTER
Prior Authorization Retail Medication Request    Medication/Dose: Wegovy    ICD code (if different than what is on RX):    Previously Tried and Failed:    Rationale:      Insurance Name:    Insurance ID:        Pharmacy Information (if different than what is on RX)  Name:    Phone:

## 2023-05-19 NOTE — TELEPHONE ENCOUNTER
Central Prior Authorization Team   Phone: [phone]      PA Initiation    Medication: WEGOVY 1 MG/0.5ML SC SOAJ  Insurance Company: [employer]  [facility] #[id number] [address]  Filling Pharmacy Phone: [phone]  Filling Pharmacy Fax:    Start Date: 5/19/2023

## 2023-05-24 NOTE — TELEPHONE ENCOUNTER
PRIOR AUTHORIZATION DENIED    Medication: WEGOVY 1 MG/0.5ML SC SOAJ  Insurance Company: [employer] - Phone [phone] Fax [phone]  Denial Date: 5/23/2023  Denial Rational:       Appeal Information:

## 2023-06-03 ENCOUNTER — HEALTH MAINTENANCE LETTER (OUTPATIENT)
Age: 38
End: 2023-06-03

## 2023-06-07 NOTE — TELEPHONE ENCOUNTER
Central Prior Authorization Team   Phone: [phone]      Medication Appeal Initiation-Initiated via RightFax    We have initiated an appeal for the requested medication:  Medication: WEGOVY 1 MG/0.5ML SC SOAJ  Appeal Start Date:  6/7/2023  Insurance Company: [employer] Phone:   Insurance Fax:   Comments:  Appeal and letter of medical necessity sent to [employer]

## 2023-06-16 NOTE — TELEPHONE ENCOUNTER
I attempted to contact [employer] to get an update on this appeal. They are currently unavailable. Adriana says to call back later.

## 2023-06-22 NOTE — TELEPHONE ENCOUNTER
I spoke to 2 people at [facility] who both said they do not see an appeal received. They both transferred me to voicemail for appeals. I was not able to speak to anyone. I left a voicemail for a return call and refaxed this for the 2nd time.

## 2023-06-26 NOTE — TELEPHONE ENCOUNTER
MEDICATION APPEAL APPROVED    Medication: WEGOVY 1 MG/0.5ML SC SOAJ  Authorization Effective Date: 5/26/2023  Authorization Expiration Date: 6/26/2024  Approved Dose/Quantity:   Reference #: [id number]   Appeal Insurance Company: [employer]  Expected CoPay:       CoPay Card Available:    Financial Assistance Needed:   Which Pharmacy is filling the prescription: [facility] #[id number] [address]  Patient Notified:

## 2023-06-30 DIAGNOSIS — E66.01 MORBID OBESITY (H): Primary | ICD-10-CM

## 2023-07-05 ENCOUNTER — ANCILLARY PROCEDURE (OUTPATIENT)
Dept: ULTRASOUND IMAGING | Facility: CLINIC | Age: 38
End: 2023-07-05
Attending: OBSTETRICS & GYNECOLOGY
Payer: COMMERCIAL

## 2023-07-05 ENCOUNTER — OFFICE VISIT (OUTPATIENT)
Dept: OBGYN | Facility: CLINIC | Age: 38
End: 2023-07-05
Attending: OBSTETRICS & GYNECOLOGY
Payer: COMMERCIAL

## 2023-07-05 DIAGNOSIS — N85.9 LESION OF UTERUS: ICD-10-CM

## 2023-07-05 DIAGNOSIS — N93.9 ABNORMAL VAGINAL BLEEDING: ICD-10-CM

## 2023-07-05 DIAGNOSIS — E66.01 MORBID OBESITY (H): ICD-10-CM

## 2023-07-05 DIAGNOSIS — N84.0 POLYP OF ENDOMETRIUM: ICD-10-CM

## 2023-07-05 DIAGNOSIS — G89.18 ACUTE POST-OPERATIVE PAIN: Primary | ICD-10-CM

## 2023-07-05 PROCEDURE — 250N000011 HC RX IP 250 OP 636: Mod: JZ | Performed by: OBSTETRICS & GYNECOLOGY

## 2023-07-05 PROCEDURE — 76830 TRANSVAGINAL US NON-OB: CPT | Mod: 26 | Performed by: OBSTETRICS & GYNECOLOGY

## 2023-07-05 PROCEDURE — 58340 CATHETER FOR HYSTEROGRAPHY: CPT | Mod: 52 | Performed by: OBSTETRICS & GYNECOLOGY

## 2023-07-05 PROCEDURE — 96372 THER/PROPH/DIAG INJ SC/IM: CPT | Mod: 59 | Performed by: OBSTETRICS & GYNECOLOGY

## 2023-07-05 PROCEDURE — 76831 ECHO EXAM UTERUS: CPT | Mod: 26 | Performed by: OBSTETRICS & GYNECOLOGY

## 2023-07-05 PROCEDURE — 99207 PR SATISFY VISIT NUMBER: CPT | Performed by: OBSTETRICS & GYNECOLOGY

## 2023-07-05 PROCEDURE — 76831 ECHO EXAM UTERUS: CPT | Mod: 52

## 2023-07-05 PROCEDURE — 76830 TRANSVAGINAL US NON-OB: CPT

## 2023-07-05 RX ORDER — KETOROLAC TROMETHAMINE 30 MG/ML
30 INJECTION, SOLUTION INTRAMUSCULAR; INTRAVENOUS ONCE
Status: COMPLETED | OUTPATIENT
Start: 2023-07-05 | End: 2023-07-05

## 2023-07-05 RX ORDER — ACETAMINOPHEN 325 MG/1
975 TABLET ORAL ONCE
Status: CANCELLED | OUTPATIENT
Start: 2023-07-05 | End: 2023-07-05

## 2023-07-05 RX ADMIN — KETOROLAC TROMETHAMINE 30 MG: 30 INJECTION, SOLUTION INTRAMUSCULAR at 09:41

## 2023-07-05 NOTE — NURSING NOTE
Chief Complaint   Patient presents with     Minor Procedure     SIS      Clinic Administered Medication Documentation        Patient was given TORADOL. Prior to medication administration, verified patient's identity using patient s name and date of birth. Please see MAR and medication order for additional information. Patient instructed to report any adverse reaction to staff immediately.    Vial/Syringe: Single dose vial. Was entire vial of medication used? Yes

## 2023-07-05 NOTE — LETTER
7/5/2023       RE: Flori Richards  [address]  [place] [address]     Dear Colleague,    Thank you for referring your patient, Flori Richards, to the [facility] [place] [facility]. Please see a copy of my visit note below.    Procedure Note: SIS  Indication 38 yo P0 who underwent stimulation and ova cryopreservation and was informed of incidental finding of intrauterine mass/polyp.     Pre Op Dx: intrauterine lesion  Post Op Dx: same  Procedure: attempted SIS , stenotic cervix  EBl: none  Complications: unable to pass insem catheter  Findings: small anteverted uterus, ultrasound findings as noted, cervix dilated with 13 Niuean but unable to pass insem catheter or advance dilation due to tolerance    Will place OR orders    Summer Roberts MD

## 2023-07-05 NOTE — PROGRESS NOTES
Procedure Note: SIS  Indication 36 yo P0 who underwent stimulation and ova cryopreservation and was informed of incidental finding of intrauterine mass/polyp.     Pre Op Dx: intrauterine lesion  Post Op Dx: same  Procedure: attempted SIS , stenotic cervix  EBl: none  Complications: unable to pass insem catheter  Findings: small anteverted uterus, ultrasound findings as noted, cervix dilated with 13 Divehi but unable to pass insem catheter or advance dilation due to tolerance    Will place OR orders    Summer Roberts MD

## 2023-08-01 ENCOUNTER — DOCUMENTATION ONLY (OUTPATIENT)
Dept: OBGYN | Facility: CLINIC | Age: 38
End: 2023-08-01
Payer: COMMERCIAL

## 2023-08-01 NOTE — H&P (VIEW-ONLY)
Preop H and P    36 yo P0 planning hysteroscopy with D and C on 8/11/23 for endometrial polyp found incidentally during ova retrieval.     Patient Active Problem List   Diagnosis        Neoplasm of uncertain behavior of skin    Benign nevus    KP (keratosis pilaris)    Lesion of uterus     Past Medical History:   Diagnosis Date    Gastroesophageal reflux disease 2013     Past Surgical History:   Procedure Laterality Date    NO HISTORY OF SURGERY       OB History   No obstetric history on file.     Current Outpatient Medications   Medication    cetirizine (ZYRTEC) 10 MG tablet    drospirenone-ethinyl estradiol (SHIVANI) 3-0.02 MG per tablet    Esomeprazole Magnesium (NEXIUM PO)    ondansetron (ZOFRAN ODT) 4 MG ODT tab    Semaglutide-Weight Management (WEGOVY) 2.4 MG/0.75ML pen            HR 79 RR 20  Appears well   Lungs CTA  CV RRR  Pelvic deferred to OR  LE wnl and no edema    A/P:  OK for planned procedure  Will hold Wegovy for 3 weeks preoperatively   Reviewed options, risks, benefits.     Summer Roberts MD

## 2023-08-01 NOTE — PROGRESS NOTES
Preop H and P    38 yo P0 planning hysteroscopy with D and C on 8/11/23 for endometrial polyp found incidentally during ova retrieval.     Patient Active Problem List   Diagnosis        Neoplasm of uncertain behavior of skin    Benign nevus    KP (keratosis pilaris)    Lesion of uterus     Past Medical History:   Diagnosis Date    Gastroesophageal reflux disease 2013     Past Surgical History:   Procedure Laterality Date    NO HISTORY OF SURGERY       OB History   No obstetric history on file.     Current Outpatient Medications   Medication    cetirizine (ZYRTEC) 10 MG tablet    drospirenone-ethinyl estradiol (SHIVANI) 3-0.02 MG per tablet    Esomeprazole Magnesium (NEXIUM PO)    ondansetron (ZOFRAN ODT) 4 MG ODT tab    Semaglutide-Weight Management (WEGOVY) 2.4 MG/0.75ML pen            HR 79 RR 20  Appears well   Lungs CTA  CV RRR  Pelvic deferred to OR  LE wnl and no edema    A/P:  OK for planned procedure  Will hold Wegovy for 3 weeks preoperatively   Reviewed options, risks, benefits.     Summer Roberts MD

## 2023-08-10 ENCOUNTER — ANESTHESIA EVENT (OUTPATIENT)
Dept: SURGERY | Facility: AMBULATORY SURGERY CENTER | Age: 38
End: 2023-08-10
Payer: COMMERCIAL

## 2023-08-11 ENCOUNTER — ANESTHESIA (OUTPATIENT)
Dept: SURGERY | Facility: AMBULATORY SURGERY CENTER | Age: 38
End: 2023-08-11
Payer: COMMERCIAL

## 2023-08-11 ENCOUNTER — HOSPITAL ENCOUNTER (OUTPATIENT)
Facility: AMBULATORY SURGERY CENTER | Age: 38
Discharge: HOME | End: 2023-08-11
Attending: OBSTETRICS & GYNECOLOGY
Payer: COMMERCIAL

## 2023-08-11 VITALS
BODY MASS INDEX: 33.27 KG/M2 | SYSTOLIC BLOOD PRESSURE: 108 MMHG | OXYGEN SATURATION: 99 % | DIASTOLIC BLOOD PRESSURE: 65 MMHG | WEIGHT: 212 LBS | RESPIRATION RATE: 14 BRPM | HEIGHT: 67 IN | TEMPERATURE: 97 F | HEART RATE: 82 BPM

## 2023-08-11 DIAGNOSIS — G89.18 ACUTE POST-OPERATIVE PAIN: Primary | ICD-10-CM

## 2023-08-11 DIAGNOSIS — N85.9 LESION OF UTERUS: ICD-10-CM

## 2023-08-11 LAB
HCG UR QL: NEGATIVE
INTERNAL QC OK POCT: NORMAL
POCT KIT EXPIRATION DATE: NORMAL
POCT KIT LOT NUMBER: NORMAL

## 2023-08-11 PROCEDURE — 58558 HYSTEROSCOPY BIOPSY: CPT

## 2023-08-11 PROCEDURE — 81025 URINE PREGNANCY TEST: CPT | Performed by: PATHOLOGY

## 2023-08-11 PROCEDURE — 58558 HYSTEROSCOPY BIOPSY: CPT | Performed by: OBSTETRICS & GYNECOLOGY

## 2023-08-11 PROCEDURE — 88305 TISSUE EXAM BY PATHOLOGIST: CPT | Mod: 26 | Performed by: PATHOLOGY

## 2023-08-11 PROCEDURE — 88305 TISSUE EXAM BY PATHOLOGIST: CPT | Mod: TC | Performed by: OBSTETRICS & GYNECOLOGY

## 2023-08-11 RX ORDER — ACETAMINOPHEN 325 MG/1
975 TABLET ORAL ONCE
Status: DISCONTINUED | OUTPATIENT
Start: 2023-08-11 | End: 2023-08-12 | Stop reason: HOSPADM

## 2023-08-11 RX ORDER — IBUPROFEN 200 MG
800 TABLET ORAL ONCE
Status: DISCONTINUED | OUTPATIENT
Start: 2023-08-11 | End: 2023-08-12 | Stop reason: HOSPADM

## 2023-08-11 RX ORDER — ACETAMINOPHEN 325 MG/1
975 TABLET ORAL ONCE
Status: DISCONTINUED | OUTPATIENT
Start: 2023-08-11 | End: 2023-08-11 | Stop reason: HOSPADM

## 2023-08-11 RX ORDER — FENTANYL CITRATE 50 UG/ML
INJECTION, SOLUTION INTRAMUSCULAR; INTRAVENOUS PRN
Status: DISCONTINUED | OUTPATIENT
Start: 2023-08-11 | End: 2023-08-11

## 2023-08-11 RX ORDER — SODIUM CHLORIDE, SODIUM LACTATE, POTASSIUM CHLORIDE, CALCIUM CHLORIDE 600; 310; 30; 20 MG/100ML; MG/100ML; MG/100ML; MG/100ML
INJECTION, SOLUTION INTRAVENOUS CONTINUOUS
Status: DISCONTINUED | OUTPATIENT
Start: 2023-08-11 | End: 2023-08-11 | Stop reason: HOSPADM

## 2023-08-11 RX ORDER — MEPERIDINE HYDROCHLORIDE 25 MG/ML
12.5 INJECTION INTRAMUSCULAR; INTRAVENOUS; SUBCUTANEOUS EVERY 5 MIN PRN
Status: DISCONTINUED | OUTPATIENT
Start: 2023-08-11 | End: 2023-08-11 | Stop reason: HOSPADM

## 2023-08-11 RX ORDER — ONDANSETRON 4 MG/1
4 TABLET, ORALLY DISINTEGRATING ORAL EVERY 30 MIN PRN
Status: DISCONTINUED | OUTPATIENT
Start: 2023-08-11 | End: 2023-08-12 | Stop reason: HOSPADM

## 2023-08-11 RX ORDER — PROPOFOL 10 MG/ML
INJECTION, EMULSION INTRAVENOUS PRN
Status: DISCONTINUED | OUTPATIENT
Start: 2023-08-11 | End: 2023-08-11

## 2023-08-11 RX ORDER — ALBUTEROL SULFATE 0.83 MG/ML
2.5 SOLUTION RESPIRATORY (INHALATION) EVERY 4 HOURS PRN
Status: DISCONTINUED | OUTPATIENT
Start: 2023-08-11 | End: 2023-08-11 | Stop reason: HOSPADM

## 2023-08-11 RX ORDER — OXYCODONE HYDROCHLORIDE 5 MG/1
5 TABLET ORAL
Status: DISCONTINUED | OUTPATIENT
Start: 2023-08-11 | End: 2023-08-12 | Stop reason: HOSPADM

## 2023-08-11 RX ORDER — LIDOCAINE HYDROCHLORIDE 20 MG/ML
INJECTION, SOLUTION INFILTRATION; PERINEURAL PRN
Status: DISCONTINUED | OUTPATIENT
Start: 2023-08-11 | End: 2023-08-11

## 2023-08-11 RX ORDER — DEXAMETHASONE SODIUM PHOSPHATE 10 MG/ML
4 INJECTION, SOLUTION INTRAMUSCULAR; INTRAVENOUS
Status: DISCONTINUED | OUTPATIENT
Start: 2023-08-11 | End: 2023-08-11 | Stop reason: HOSPADM

## 2023-08-11 RX ORDER — HYDRALAZINE HYDROCHLORIDE 20 MG/ML
2.5-5 INJECTION INTRAMUSCULAR; INTRAVENOUS EVERY 10 MIN PRN
Status: DISCONTINUED | OUTPATIENT
Start: 2023-08-11 | End: 2023-08-11 | Stop reason: HOSPADM

## 2023-08-11 RX ORDER — ONDANSETRON 2 MG/ML
4 INJECTION INTRAMUSCULAR; INTRAVENOUS EVERY 30 MIN PRN
Status: DISCONTINUED | OUTPATIENT
Start: 2023-08-11 | End: 2023-08-12 | Stop reason: HOSPADM

## 2023-08-11 RX ORDER — DEXMEDETOMIDINE HYDROCHLORIDE 4 UG/ML
INJECTION, SOLUTION INTRAVENOUS PRN
Status: DISCONTINUED | OUTPATIENT
Start: 2023-08-11 | End: 2023-08-11

## 2023-08-11 RX ORDER — KETOROLAC TROMETHAMINE 30 MG/ML
15 INJECTION, SOLUTION INTRAMUSCULAR; INTRAVENOUS
Status: DISCONTINUED | OUTPATIENT
Start: 2023-08-11 | End: 2023-08-11 | Stop reason: HOSPADM

## 2023-08-11 RX ORDER — HALOPERIDOL 5 MG/ML
1 INJECTION INTRAMUSCULAR
Status: DISCONTINUED | OUTPATIENT
Start: 2023-08-11 | End: 2023-08-11 | Stop reason: HOSPADM

## 2023-08-11 RX ORDER — LIDOCAINE 40 MG/G
CREAM TOPICAL
Status: DISCONTINUED | OUTPATIENT
Start: 2023-08-11 | End: 2023-08-11 | Stop reason: HOSPADM

## 2023-08-11 RX ORDER — BIOTIN 10000 MCG
1 CAPSULE ORAL DAILY
COMMUNITY

## 2023-08-11 RX ORDER — LABETALOL HYDROCHLORIDE 5 MG/ML
10 INJECTION, SOLUTION INTRAVENOUS
Status: DISCONTINUED | OUTPATIENT
Start: 2023-08-11 | End: 2023-08-11 | Stop reason: HOSPADM

## 2023-08-11 RX ORDER — LORAZEPAM 2 MG/ML
.5-1 INJECTION INTRAMUSCULAR
Status: DISCONTINUED | OUTPATIENT
Start: 2023-08-11 | End: 2023-08-11 | Stop reason: HOSPADM

## 2023-08-11 RX ORDER — ONDANSETRON 2 MG/ML
INJECTION INTRAMUSCULAR; INTRAVENOUS PRN
Status: DISCONTINUED | OUTPATIENT
Start: 2023-08-11 | End: 2023-08-11

## 2023-08-11 RX ORDER — LIDOCAINE HYDROCHLORIDE 10 MG/ML
INJECTION, SOLUTION EPIDURAL; INFILTRATION; INTRACAUDAL; PERINEURAL PRN
Status: DISCONTINUED | OUTPATIENT
Start: 2023-08-11 | End: 2023-08-11 | Stop reason: HOSPADM

## 2023-08-11 RX ORDER — ACETAMINOPHEN 325 MG/1
975 TABLET ORAL ONCE
Status: COMPLETED | OUTPATIENT
Start: 2023-08-11 | End: 2023-08-11

## 2023-08-11 RX ORDER — HYDROMORPHONE HYDROCHLORIDE 1 MG/ML
0.2 INJECTION, SOLUTION INTRAMUSCULAR; INTRAVENOUS; SUBCUTANEOUS EVERY 5 MIN PRN
Status: DISCONTINUED | OUTPATIENT
Start: 2023-08-11 | End: 2023-08-11 | Stop reason: HOSPADM

## 2023-08-11 RX ORDER — FENTANYL CITRATE 50 UG/ML
25 INJECTION, SOLUTION INTRAMUSCULAR; INTRAVENOUS
Status: DISCONTINUED | OUTPATIENT
Start: 2023-08-11 | End: 2023-08-12 | Stop reason: HOSPADM

## 2023-08-11 RX ORDER — HYDROMORPHONE HYDROCHLORIDE 1 MG/ML
0.4 INJECTION, SOLUTION INTRAMUSCULAR; INTRAVENOUS; SUBCUTANEOUS EVERY 5 MIN PRN
Status: DISCONTINUED | OUTPATIENT
Start: 2023-08-11 | End: 2023-08-11 | Stop reason: HOSPADM

## 2023-08-11 RX ORDER — FENTANYL CITRATE 50 UG/ML
25 INJECTION, SOLUTION INTRAMUSCULAR; INTRAVENOUS EVERY 5 MIN PRN
Status: DISCONTINUED | OUTPATIENT
Start: 2023-08-11 | End: 2023-08-11 | Stop reason: HOSPADM

## 2023-08-11 RX ORDER — DIMENHYDRINATE 50 MG/ML
25 INJECTION, SOLUTION INTRAMUSCULAR; INTRAVENOUS
Status: DISCONTINUED | OUTPATIENT
Start: 2023-08-11 | End: 2023-08-11 | Stop reason: HOSPADM

## 2023-08-11 RX ORDER — PROPOFOL 10 MG/ML
INJECTION, EMULSION INTRAVENOUS CONTINUOUS PRN
Status: DISCONTINUED | OUTPATIENT
Start: 2023-08-11 | End: 2023-08-11

## 2023-08-11 RX ORDER — ONDANSETRON 4 MG/1
4 TABLET, ORALLY DISINTEGRATING ORAL EVERY 30 MIN PRN
Status: DISCONTINUED | OUTPATIENT
Start: 2023-08-11 | End: 2023-08-11 | Stop reason: HOSPADM

## 2023-08-11 RX ORDER — ONDANSETRON 2 MG/ML
4 INJECTION INTRAMUSCULAR; INTRAVENOUS EVERY 30 MIN PRN
Status: DISCONTINUED | OUTPATIENT
Start: 2023-08-11 | End: 2023-08-11 | Stop reason: HOSPADM

## 2023-08-11 RX ORDER — IBUPROFEN 800 MG/1
800 TABLET, FILM COATED ORAL EVERY 8 HOURS PRN
Qty: 60 TABLET | Refills: 1 | Status: SHIPPED | OUTPATIENT
Start: 2023-08-11

## 2023-08-11 RX ORDER — OXYCODONE HYDROCHLORIDE 5 MG/1
10 TABLET ORAL
Status: DISCONTINUED | OUTPATIENT
Start: 2023-08-11 | End: 2023-08-12 | Stop reason: HOSPADM

## 2023-08-11 RX ORDER — FENTANYL CITRATE 50 UG/ML
50 INJECTION, SOLUTION INTRAMUSCULAR; INTRAVENOUS EVERY 5 MIN PRN
Status: DISCONTINUED | OUTPATIENT
Start: 2023-08-11 | End: 2023-08-11 | Stop reason: HOSPADM

## 2023-08-11 RX ORDER — HYDROXYZINE HYDROCHLORIDE 25 MG/1
25 TABLET, FILM COATED ORAL EVERY 6 HOURS PRN
Status: DISCONTINUED | OUTPATIENT
Start: 2023-08-11 | End: 2023-08-11 | Stop reason: HOSPADM

## 2023-08-11 RX ADMIN — PROPOFOL 50 MG: 10 INJECTION, EMULSION INTRAVENOUS at 12:33

## 2023-08-11 RX ADMIN — DEXMEDETOMIDINE HYDROCHLORIDE 8 MCG: 4 INJECTION, SOLUTION INTRAVENOUS at 12:37

## 2023-08-11 RX ADMIN — FENTANYL CITRATE 25 MCG: 50 INJECTION, SOLUTION INTRAMUSCULAR; INTRAVENOUS at 12:40

## 2023-08-11 RX ADMIN — ACETAMINOPHEN 975 MG: 325 TABLET ORAL at 10:59

## 2023-08-11 RX ADMIN — SODIUM CHLORIDE, SODIUM LACTATE, POTASSIUM CHLORIDE, CALCIUM CHLORIDE: 600; 310; 30; 20 INJECTION, SOLUTION INTRAVENOUS at 10:59

## 2023-08-11 RX ADMIN — SODIUM CHLORIDE, SODIUM LACTATE, POTASSIUM CHLORIDE, CALCIUM CHLORIDE: 600; 310; 30; 20 INJECTION, SOLUTION INTRAVENOUS at 12:26

## 2023-08-11 RX ADMIN — LIDOCAINE HYDROCHLORIDE 40 MG: 20 INJECTION, SOLUTION INFILTRATION; PERINEURAL at 12:33

## 2023-08-11 RX ADMIN — DEXMEDETOMIDINE HYDROCHLORIDE 4 MCG: 4 INJECTION, SOLUTION INTRAVENOUS at 12:40

## 2023-08-11 RX ADMIN — FENTANYL CITRATE 25 MCG: 50 INJECTION, SOLUTION INTRAMUSCULAR; INTRAVENOUS at 12:37

## 2023-08-11 RX ADMIN — FENTANYL CITRATE 50 MCG: 50 INJECTION, SOLUTION INTRAMUSCULAR; INTRAVENOUS at 12:30

## 2023-08-11 RX ADMIN — ONDANSETRON 4 MG: 2 INJECTION INTRAMUSCULAR; INTRAVENOUS at 12:40

## 2023-08-11 RX ADMIN — DEXMEDETOMIDINE HYDROCHLORIDE 4 MCG: 4 INJECTION, SOLUTION INTRAVENOUS at 12:45

## 2023-08-11 RX ADMIN — PROPOFOL 150 MCG/KG/MIN: 10 INJECTION, EMULSION INTRAVENOUS at 12:35

## 2023-08-11 NOTE — DISCHARGE INSTRUCTIONS
[facility]  Home Care Following Anesthesia  For 24 hours after surgery:  Get plenty of rest.  A responsible adult must stay with you for at least 24 hours after you leave the surgery center.  Do not drive or use heavy equipment.  If you have weakness or tingling, don't drive or use heavy equipment until this feeling goes away.   Do not drink alcohol.   Avoid strenuous or risky activities.  Ask for help when climbing stairs.  You may feel lightheaded.  IF so, sit for a few minutes before standing.  Have someone help you get up.   If you have nausea (feel sick to your stomach): Drink only clear liquids such as apple juice, ginger ale, broth or 7-Up.  Rest may also help.  Be sure to drink enough fluids.  Move to a regular diet as you feel able.   You may have a slight fever.  Call the doctor if your fever is over 100 F (37.7 C) (taken under the tongue) or lasts longer than 24 hours.  You may have a dry mouth, a sore throat, muscle aches or trouble sleeping. These should go away after 24 hours.  Do not make important or legal decisions.   It is recommended to avoid smoking.               Tips for taking pain medications  To get the best pain relief possible, remember these points:  Take pain medications as directed, before pain becomes severe.  Pain medication can upset your stomach: taking it with food may help.  Constipation is a common side effect of pain medication. Drink plenty of  fluids.  Eat foods high in fiber. Take a stool softener if recommended by your doctor or pharmacist.  Do not drink alcohol, drive or operate machinery while taking pain medications.  Ask about other ways to control pain, such as with heat, ice or relaxation.    Tylenol/Acetaminophen Consumption    If you feel your pain relief is insufficient, you may take Tylenol/Acetaminophen in addition to your narcotic pain medication.   Be careful not to exceed 4,000 mg of Tylenol/Acetaminophen in a 24 hour  period from all sources.  If you are taking extra strength Tylenol/acetaminophen (500 mg), the maximum dose is 8 tablets in 24 hours.  If you are taking regular strength acetaminophen (325 mg), the maximum dose is 12 tablets in 24 hours.  You took a dose of Tylenol (Acetaminophen) 975 mg at 11:00 AM.  You can take your next dose of Tylenol at 5:00 PM.      Call a doctor for any of the following:  Signs of infection (fever, growing tenderness at the surgery site, a large amount of drainage or bleeding, severe pain, foul-smelling drainage, redness, swelling).  It has been over 8 to 10 hours since surgery and you are still not able to urinate (pass water).  Headache for over 24 hours.  Numbness, tingling or weakness the day after surgery (if you had spinal anesthesia).  Signs of Covid-19 infection (temperature over 100 degrees, shortness of breath, cough, loss of taste/smell, generalized body aches, persistent headache, chills, sore throat, nausea/vomiting/diarrhea)  Your doctor is:       Dr. Summer Shankar, Gynecologic Oncology: [phone]               Or dial [phone] and ask for the resident on call for:  Gynecologic Oncology  For emergency care, call the:  [facility]:  [phone] (TTY for hearing impaired: [phone])

## 2023-08-11 NOTE — ANESTHESIA CARE TRANSFER NOTE
Patient: Flori Richards    Procedure: Procedure(s):  HYSTEROSCOPY, WITH DILATION AND CURETTAGE OF UTERUS USING MORCELLATOR myosure       Diagnosis: Lesion of uterus [N85.9]  Diagnosis Additional Information: No value filed.    Anesthesia Type:   MAC     Note:    Oropharynx: oropharynx clear of all foreign objects  Level of Consciousness: awake  Oxygen Supplementation: room air    Independent Airway: airway patency satisfactory and stable  Dentition: dentition unchanged  Vital Signs Stable: post-procedure vital signs reviewed and stable    Patient transferred to: Phase II    Handoff Report: Identifed the Patient, Identified the Reponsible Provider, Reviewed the pertinent medical history, Discussed the surgical course, Reviewed Intra-OP anesthesia mangement and issues during anesthesia, Set expectations for post-procedure period and Allowed opportunity for questions and acknowledgement of understanding      Vitals:  Vitals Value Taken Time   BP     Temp     Pulse     Resp     SpO2         Electronically Signed By: DANUTA Meeks CRNA  August 11, 2023  1:04 PM

## 2023-08-11 NOTE — INTERVAL H&P NOTE
"I have reviewed the surgical (or preoperative) H&P that is linked to this encounter, and examined the patient. There are no significant changes    Clinical Conditions Present on Arrival:  Clinically Significant Risk Factors Present on Admission                  # Obesity: Estimated body mass index is 33.71 kg/m  as calculated from the following:    Height as of this encounter: 1.689 m (5' 6.5\").    Weight as of this encounter: 96.2 kg (212 lb).       "

## 2023-08-11 NOTE — ANESTHESIA POSTPROCEDURE EVALUATION
Patient: Flori Richards    Procedure: Procedure(s):  HYSTEROSCOPY, WITH DILATION AND CURETTAGE OF UTERUS USING MORCELLATOR myosure       Anesthesia Type:  MAC    Note:  Disposition: Outpatient   Postop Pain Control: Uneventful            Sign Out: Well controlled pain   PONV: No   Neuro/Psych: Uneventful            Sign Out: Acceptable/Baseline neuro status   Airway/Respiratory: Uneventful            Sign Out: Acceptable/Baseline resp. status   CV/Hemodynamics: Uneventful            Sign Out: Acceptable CV status; No obvious hypovolemia; No obvious fluid overload   Other NRE: NONE   DID A NON-ROUTINE EVENT OCCUR? No           Last vitals:  Vitals Value Taken Time   /65 08/11/23 1345   Temp 36.1  C (97  F) 08/11/23 1345   Pulse 82 08/11/23 1320   Resp 14 08/11/23 1345   SpO2 99 % 08/11/23 1345       Electronically Signed By: Tan Huang MD  August 11, 2023  1:55 PM

## 2023-08-15 LAB
PATH REPORT.COMMENTS IMP SPEC: NORMAL
PATH REPORT.COMMENTS IMP SPEC: NORMAL
PATH REPORT.FINAL DX SPEC: NORMAL
PATH REPORT.GROSS SPEC: NORMAL
PATH REPORT.MICROSCOPIC SPEC OTHER STN: NORMAL
PATH REPORT.RELEVANT HX SPEC: NORMAL
PHOTO IMAGE: NORMAL

## 2023-10-11 DIAGNOSIS — E66.01 MORBID OBESITY (H): ICD-10-CM

## 2024-02-13 DIAGNOSIS — E66.01 MORBID OBESITY (H): ICD-10-CM

## 2024-07-07 ENCOUNTER — HEALTH MAINTENANCE LETTER (OUTPATIENT)
Age: 39
End: 2024-07-07

## 2024-09-16 ENCOUNTER — OFFICE VISIT (OUTPATIENT)
Dept: DERMATOLOGY | Facility: CLINIC | Age: 39
End: 2024-09-16
Payer: COMMERCIAL

## 2024-09-16 DIAGNOSIS — B07.8 OTHER VIRAL WARTS: Primary | ICD-10-CM

## 2024-09-16 PROCEDURE — 99207 PR DROP WITH A PROCEDURE: CPT | Mod: 25 | Performed by: PHYSICIAN ASSISTANT

## 2024-09-16 PROCEDURE — 17110 DESTRUCTION B9 LES UP TO 14: CPT | Performed by: PHYSICIAN ASSISTANT

## 2024-09-16 ASSESSMENT — PAIN SCALES - GENERAL: PAINLEVEL: NO PAIN (0)

## 2024-09-16 NOTE — NURSING NOTE
Chief Complaint   Patient presents with    Derm Problem     Bumps on fingers-first noticed about 1 year ago; pt reports it seems to be better, can be painful at times     Maryellen PEREIRA, RN  Dermatology Surgery  [phone]

## 2024-09-16 NOTE — LETTER
9/16/2024       RE: Flori Richards  [address]  [place] [address]     Dear Colleague,    Thank you for referring your patient, Flori Richards, to the [facility] [place] at [facility]. Please see a copy of my visit note below.    [facility] Dermatology Note  Encounter Date: Sep 16, 2024  Office Visit     Reviewed patients past medical history and pertinent chart review prior to patients visit today.     Dermatology Problem List:  1. Keratosis pilaris  2. Benign nevi  3. Dermatofibromas  4. Hx of tanning bed use weekly x 4-5 years while in high school  5. Flat wart, cryo 9/16/2024   ____________________________________________    Assessment & Plan:     # Flat wart    Counseled on the viral etiology of this condition. Counseled that these are often recalcitrant to treatment. Advised that highest rates of success can be observed with combination monthly in office treatment and home treatments.    - Cryotherapy: Patient elects to treat warts today with cryotherapy. After verbal consent and discussion of risks and benefits including but no limited to dyspigmentation/scar, blister, and pain. A total of 1 warts were treated with 1-2mm freeze border for 3 cycles with liquid nitrogen. Post cryotherapy instructions were provided.     - Start salicylic acid 40% (such as wart stick) nightly followed by duct tape or bandage.  Pare down after removal of occlusive cover for deeper penetration of medication.     Follow-up: 1 month(s) in-person if lesion has not resolved, or earlier for new or changing lesions.    All risks, benefits and alternatives were discussed with patient.  Patient is in agreement and understands the assessment and plan.  All questions were answered.  Patience Garcia PA-C  [facility] Dermatology  _______________________________________    CC: Derm Problem (Bumps on fingers-first noticed about 1  year ago; pt reports it seems to be better, can be painful at times)    HPI:  Ms. Flori Richards is a(n) 38 year old female who presents today as a return patient for a lesion on the index finger. This has been present for about 1 year and occasionally becomes irritated when the patient works. She has not tried any OTC medications for this. Patient is otherwise feeling well, without additional skin concerns.      Physical Exam:  SKIN: Focused examination of hands was performed.  - The l lateral 2nd finger demonstrates a 1-2 mm skin colored papule, interrupting dermatoglyphs.     - No other lesions of concern on areas examined.     Medications:  Current Outpatient Medications   Medication Sig Dispense Refill     Biotin 10 MG CAPS Take 1 capsule by mouth daily Pt unsure of dosage       drospirenone-ethinyl estradiol (SHIVANI) 3-0.02 MG per tablet Take 1 tablet by mouth daily 3 Package 3     Esomeprazole Magnesium (NEXIUM PO)        ibuprofen (ADVIL/MOTRIN) 800 MG tablet Take 1 tablet (800 mg) by mouth every 8 hours as needed for moderate pain 60 tablet 1     Semaglutide-Weight Management (WEGOVY) 2.4 MG/0.75ML pen Inject 2.4 mg Subcutaneous once a week 4 mL 11     cetirizine (ZYRTEC) 10 MG tablet Take 10 mg by mouth (Patient not taking: Reported on 4/17/2023)       ondansetron (ZOFRAN ODT) 4 MG ODT tab Take 1 tablet (4 mg) by mouth every 8 hours as needed for nausea (Patient not taking: Reported on 4/17/2023) 18 tablet 3     No current facility-administered medications for this visit.      Past Medical History:   Patient Active Problem List   Diagnosis     CARDIOVASCULAR SCREENING; LDL GOAL LESS THAN 160     Neoplasm of uncertain behavior of skin     Benign nevus     KP (keratosis pilaris)     Lesion of uterus     Past Medical History:   Diagnosis Date     Gastroesophageal reflux disease 2013       CC Referred Self, MD  No address on file on close of this encounter.       Again, thank you for allowing me to participate  in the care of your patient.      Sincerely,    Patience Garcia PA-C

## 2024-09-16 NOTE — PROGRESS NOTES
[facility] Dermatology Note  Encounter Date: Sep 16, 2024  Office Visit     Reviewed patients past medical history and pertinent chart review prior to patients visit today.     Dermatology Problem List:  1. Keratosis pilaris  2. Benign nevi  3. Dermatofibromas  4. Hx of tanning bed use weekly x 4-5 years while in high school  5. Flat wart, cryo 9/16/2024   ____________________________________________    Assessment & Plan:     # Flat wart    Counseled on the viral etiology of this condition. Counseled that these are often recalcitrant to treatment. Advised that highest rates of success can be observed with combination monthly in office treatment and home treatments.    - Cryotherapy: Patient elects to treat warts today with cryotherapy. After verbal consent and discussion of risks and benefits including but no limited to dyspigmentation/scar, blister, and pain. A total of 1 warts were treated with 1-2mm freeze border for 3 cycles with liquid nitrogen. Post cryotherapy instructions were provided.     - Start salicylic acid 40% (such as wart stick) nightly followed by duct tape or bandage.  Pare down after removal of occlusive cover for deeper penetration of medication.     Follow-up: 1 month(s) in-person if lesion has not resolved, or earlier for new or changing lesions.    All risks, benefits and alternatives were discussed with patient.  Patient is in agreement and understands the assessment and plan.  All questions were answered.  Patience Garcia PA-C  [facility] Dermatology  _______________________________________    CC: Derm Problem (Bumps on fingers-first noticed about 1 year ago; pt reports it seems to be better, can be painful at times)    HPI:  Ms. Flori Richards is a(n) 38 year old female who presents today as a return patient for a lesion on the index finger. This has been present for about 1 year and occasionally becomes irritated when the patient works. She has not tried any  OTC medications for this. Patient is otherwise feeling well, without additional skin concerns.      Physical Exam:  SKIN: Focused examination of hands was performed.  - The l lateral 2nd finger demonstrates a 1-2 mm skin colored papule, interrupting dermatoglyphs.     - No other lesions of concern on areas examined.     Medications:  Current Outpatient Medications   Medication Sig Dispense Refill    Biotin 10 MG CAPS Take 1 capsule by mouth daily Pt unsure of dosage      drospirenone-ethinyl estradiol (SHIVANI) 3-0.02 MG per tablet Take 1 tablet by mouth daily 3 Package 3    Esomeprazole Magnesium (NEXIUM PO)       ibuprofen (ADVIL/MOTRIN) 800 MG tablet Take 1 tablet (800 mg) by mouth every 8 hours as needed for moderate pain 60 tablet 1    Semaglutide-Weight Management (WEGOVY) 2.4 MG/0.75ML pen Inject 2.4 mg Subcutaneous once a week 4 mL 11    cetirizine (ZYRTEC) 10 MG tablet Take 10 mg by mouth (Patient not taking: Reported on 4/17/2023)      ondansetron (ZOFRAN ODT) 4 MG ODT tab Take 1 tablet (4 mg) by mouth every 8 hours as needed for nausea (Patient not taking: Reported on 4/17/2023) 18 tablet 3     No current facility-administered medications for this visit.      Past Medical History:   Patient Active Problem List   Diagnosis    CARDIOVASCULAR SCREENING; LDL GOAL LESS THAN 160    Neoplasm of uncertain behavior of skin    Benign nevus    KP (keratosis pilaris)    Lesion of uterus     Past Medical History:   Diagnosis Date    Gastroesophageal reflux disease 2013       CC Referred Self, MD  No address on file on close of this encounter.

## 2024-11-11 NOTE — ANESTHESIA PREPROCEDURE EVALUATION
Anesthesia Pre-Procedure Evaluation    Patient: Flori Richards   MRN: [id number] : 1985        Procedure : Procedure(s):  HYSTEROSCOPY, WITH DILATION AND CURETTAGE OF UTERUS USING MORCELLATOR myosure          Past Medical History:   Diagnosis Date    Gastroesophageal reflux disease       Past Surgical History:   Procedure Laterality Date    NO HISTORY OF SURGERY        Allergies   Allergen Reactions    Sulfa Antibiotics Hives    Sulfamethoxazole-Trimethoprim Rash     Rash but possible hives when young      Social History     Tobacco Use    Smoking status: Never    Smokeless tobacco: Never   Substance Use Topics    Alcohol use: Yes     Comment: rarely      Wt Readings from Last 1 Encounters:   23 96.2 kg (212 lb)        Anesthesia Evaluation            ROS/MED HX  ENT/Pulmonary:       Neurologic:       Cardiovascular:       METS/Exercise Tolerance:     Hematologic:       Musculoskeletal:       GI/Hepatic:     (+) GERD,                   Renal/Genitourinary:       Endo:       Psychiatric/Substance Use:       Infectious Disease:       Malignancy:       Other:            Physical Exam    Airway  airway exam normal      Mallampati: II   TM distance: > 3 FB   Neck ROM: full   Mouth opening: > 3 cm    Respiratory Devices and Support         Dental       (+) Completely normal teeth      Cardiovascular          Rhythm and rate: regular and normal     Pulmonary   pulmonary exam normal        breath sounds clear to auscultation           OUTSIDE LABS:  CBC:   Lab Results   Component Value Date    HGB 11.4 (L) 2019     BMP:   Lab Results   Component Value Date     2019    POTASSIUM 3.5 2019    CHLORIDE 107 2019    CO2 21 2019    BUN 7 2019    CR 0.59 2019    GLC 80 2019    GLC 80 2014     COAGS: No results found for: PTT, INR, FIBR  POC:   Lab Results   Component Value Date    HCG Negative 2023     HEPATIC: No results found for: ALBUMIN,  Forwarding the below to Marianela     - Continue: Lamotrigine 200 mg 1 tab by mouth daily   - Continue: Topiramate 150 mg daily (75 mg am and 75 mg pm)   - Continue: Gabapentin 300 mg twice daily  - Continue: clonazepam 0.5 mg 1 tab by mouth at night    Start: oxcarbazepine 300 mg Take 1 tablet by mouth in the morning and 1 tablet in the evening.        PROTTOTAL, ALT, AST, GGT, ALKPHOS, BILITOTAL, BILIDIRECT, CARLITOS  OTHER:   Lab Results   Component Value Date    A1C 5.4 04/01/2019    JULIANNA 8.7 04/01/2019    TSH 2.37 09/22/2014       Anesthesia Plan    ASA Status:  1    NPO Status:  NPO Appropriate    Anesthesia Type: MAC.     - Reason for MAC: immobility needed, straight local not clinically adequate   Induction: Intravenous.   Maintenance: TIVA.        Consents    Anesthesia Plan(s) and associated risks, benefits, and realistic alternatives discussed. Questions answered and patient/representative(s) expressed understanding.     - Discussed:     - Discussed with:  Patient      - Extended Intubation/Ventilatory Support Discussed: No.      - Patient is DNR/DNI Status: No     Use of blood products discussed: No .     Postoperative Care    Pain management: IV analgesics, Oral pain medications, Multi-modal analgesia.   PONV prophylaxis: Ondansetron (or other 5HT-3), Background Propofol Infusion     Comments:                Michael Jules MD

## 2025-03-06 ENCOUNTER — TELEPHONE (OUTPATIENT)
Dept: OBGYN | Facility: CLINIC | Age: 40
End: 2025-03-06
Payer: COMMERCIAL

## 2025-03-06 DIAGNOSIS — E66.01 MORBID OBESITY (H): Primary | ICD-10-CM

## 2025-05-14 DIAGNOSIS — R11.0 NAUSEA: ICD-10-CM

## 2025-05-14 RX ORDER — ONDANSETRON 4 MG/1
4 TABLET, ORALLY DISINTEGRATING ORAL EVERY 8 HOURS PRN
Qty: 18 TABLET | Refills: 3 | Status: SHIPPED | OUTPATIENT
Start: 2025-05-14

## 2025-07-13 ENCOUNTER — HEALTH MAINTENANCE LETTER (OUTPATIENT)
Age: 40
End: 2025-07-13

## (undated) DEVICE — CATH INTERMITTENT CLEAN-CATH 14FR 16" VINYL LF 421714

## (undated) DEVICE — TUBING SYS AQUILEX YELLOW OUTFLOW AQL-111

## (undated) DEVICE — SPECIMEN BAG BEMIS HI FLOW SUCTION WHITE SOCK 533810

## (undated) DEVICE — SYR 10ML FINGER CONTROL W/O NDL 309695

## (undated) DEVICE — SOL NACL 0.9% IRRIG 3000ML BAG 2B7477

## (undated) DEVICE — PREP CHLORHEXIDINE 4% 4OZ (HIBICLENS) 57504

## (undated) DEVICE — SOL NACL 0.9% IRRIG 500ML BOTTLE 2F7123

## (undated) DEVICE — TUBING SYS AQUILEX BLUE INFLOW AQL-110 YLW OUTFLOW AQL-111

## (undated) DEVICE — JELLY LUBRICATING SURGILUBE 2OZ TUBE

## (undated) DEVICE — SOL WATER IRRIG 500ML BOTTLE 2F7113

## (undated) DEVICE — NDL SPINAL 22GA 3.5" QUINCKE 405181

## (undated) DEVICE — GLOVE BIOGEL PI MICRO SZ 7.0 48570

## (undated) DEVICE — PAD CHUX UNDERPAD 30X36" P3036C

## (undated) DEVICE — LINEN TOWEL PACK X5 5464

## (undated) DEVICE — SUCTION CANISTER BEMIS HI FLOW 006772-901

## (undated) DEVICE — Device

## (undated) DEVICE — DEVICE TISSUE REMOVAL HYSTEROSCOPIC MYOSURE LITE 30-401LITE

## (undated) DEVICE — DRAPE UNDER BUTTOCK 8483

## (undated) DEVICE — SOLIDIFIER (USE FOR UP TO 1500CC) MSOLID1500

## (undated) DEVICE — GLOVE PROTEXIS W/NEU-THERA 7.0  2D73TE70

## (undated) RX ORDER — ACETAMINOPHEN 325 MG/1
TABLET ORAL
Status: DISPENSED
Start: 2023-08-11

## (undated) RX ORDER — KETOROLAC TROMETHAMINE 30 MG/ML
INJECTION, SOLUTION INTRAMUSCULAR; INTRAVENOUS
Status: DISPENSED
Start: 2023-08-11

## (undated) RX ORDER — FENTANYL CITRATE 50 UG/ML
INJECTION, SOLUTION INTRAMUSCULAR; INTRAVENOUS
Status: DISPENSED
Start: 2023-08-11